# Patient Record
Sex: FEMALE | Race: ASIAN | URBAN - METROPOLITAN AREA
[De-identification: names, ages, dates, MRNs, and addresses within clinical notes are randomized per-mention and may not be internally consistent; named-entity substitution may affect disease eponyms.]

---

## 2019-06-12 ENCOUNTER — OFFICE (OUTPATIENT)
Dept: URBAN - METROPOLITAN AREA CLINIC 78 | Facility: CLINIC | Age: 29
End: 2019-06-12

## 2019-06-12 VITALS
SYSTOLIC BLOOD PRESSURE: 118 MMHG | WEIGHT: 146 LBS | DIASTOLIC BLOOD PRESSURE: 95 MMHG | HEIGHT: 62 IN | TEMPERATURE: 97 F | HEART RATE: 77 BPM

## 2019-06-12 DIAGNOSIS — R19.7 DIARRHEA, UNSPECIFIED: ICD-10-CM

## 2019-06-12 DIAGNOSIS — R74.8 ABNORMAL LEVELS OF OTHER SERUM ENZYMES: ICD-10-CM

## 2019-06-12 DIAGNOSIS — E28.2 POLYCYSTIC OVARIAN SYNDROME: ICD-10-CM

## 2019-06-12 PROCEDURE — 99244 OFF/OP CNSLTJ NEW/EST MOD 40: CPT

## 2019-06-12 NOTE — SERVICEHPINOTES
NEVIN WILSON   is a   28   year old    female who is being seen in consultation at the request of   FABY IRBY   for elevated liver enzymes. She had mild liver enzyme elevation in April with f/u labs in May showing ALT closer to normal. She was encouraged to come see us because she also has been having post-prandial loose stools over the past few months. She has long h/o being gassy as well. She has felt ok otherwise. Denies blood in stools, fevers, vomiting, weight loss, abdominal pain. No known family h/o GI or liver disorders. She has h/o migraines with occasional use of triptan medication. Has PCOS and has been on OCP for a number of years since having an ovarian cyst removed in past.   5/20/19 AST/ALT 28/31BR4/26/19 AST/ALT 27/38

## 2019-07-10 ENCOUNTER — OFFICE (OUTPATIENT)
Dept: URBAN - METROPOLITAN AREA CLINIC 78 | Facility: CLINIC | Age: 29
End: 2019-07-10

## 2019-07-10 VITALS
HEART RATE: 74 BPM | TEMPERATURE: 97 F | HEIGHT: 62 IN | DIASTOLIC BLOOD PRESSURE: 88 MMHG | WEIGHT: 138 LBS | SYSTOLIC BLOOD PRESSURE: 111 MMHG

## 2019-07-10 DIAGNOSIS — E28.2 POLYCYSTIC OVARIAN SYNDROME: ICD-10-CM

## 2019-07-10 DIAGNOSIS — R74.8 ABNORMAL LEVELS OF OTHER SERUM ENZYMES: ICD-10-CM

## 2019-07-10 PROCEDURE — 99213 OFFICE O/P EST LOW 20 MIN: CPT

## 2019-07-10 NOTE — SERVICEHPINOTES
30 yo female presents for f/u elevated liver enzymes. Her recent labs show normal liver enzymes but suggestion of significant insulin resistance which would go along with her known PCOS. Her celiac panel and other labs were negative, and fecal calpro was normal (she had complained of gas and loose stools). She had seen her high insulin level on her labs and was motivated to change her diet (following our low carb handout) and start a Saadia class. She has lost 8 pounds so far and her loose stools resolved. Feeling well overall and pleased with her progress.6/17/19 CBC wnl, AST/ALT 18/14, fecal calpro &lt16, celiac panel neg, Hep C ab neg, HBsAg neg, ESR 12, TSH 1.06, HgbA1C 5.2, glucose 98, insulin 24.1 LUPE 5.83 (c/w severe insulin resistance)BR5/20/19 AST/ALT 28/31BR4/26/19 AST/ALT 27/38